# Patient Record
Sex: MALE | Race: WHITE | NOT HISPANIC OR LATINO | URBAN - METROPOLITAN AREA
[De-identification: names, ages, dates, MRNs, and addresses within clinical notes are randomized per-mention and may not be internally consistent; named-entity substitution may affect disease eponyms.]

---

## 2017-05-07 ENCOUNTER — HOSPITAL ENCOUNTER (OUTPATIENT)
Dept: URGENT CARE | Facility: CLINIC | Age: 27
Setting detail: SPECIMEN
Discharge: HOME | End: 2017-05-07
Attending: FAMILY MEDICINE | Admitting: FAMILY MEDICINE

## 2017-05-07 LAB
AZTREONAM SUSC ISLT: NORMAL
BACTERIA ISLT: NORMAL
BACTERIA SPEC AEROBE CULT: NORMAL
CEFEPIME SUSC ISLT: NORMAL
CEFTRIAXONE SUSC ISLT: NORMAL
CIPROFLOXACIN SUSC ISLT: NORMAL
COLONY COUNT: NORMAL
ERTAPENEM SUSC ISLT: NORMAL
LEVOFLOXACIN SUSC ISLT: NORMAL
Lab: NORMAL
MEROPENEM SUSC ISLT: NORMAL
MICRO REPORT STATUS: NORMAL
NITROFURANTOIN SUSC ISLT: NORMAL
PIP+TAZO SUSC ISLT: NORMAL
SPECIMEN SOURCE: NORMAL
SUSC METH SPEC: NORMAL
TETRACYCLINE SUSC ISLT: NORMAL
TOBRAMYCIN SUSC ISLT: NORMAL
TRIMETHOPRIM/SULFA: NORMAL

## 2023-07-28 ENCOUNTER — HOSPITAL ENCOUNTER (OUTPATIENT)
Dept: CARDIOLOGY | Facility: HOSPITAL | Age: 33
Discharge: HOME | End: 2023-07-28
Payer: MEDICARE

## 2023-07-28 DIAGNOSIS — R00.0 TACHYCARDIA: ICD-10-CM

## 2023-07-28 PROCEDURE — 93306 TTE W/DOPPLER COMPLETE: CPT

## 2023-07-31 ENCOUNTER — OFFICE VISIT (OUTPATIENT)
Dept: CARDIOLOGY | Facility: CLINIC | Age: 33
End: 2023-07-31
Payer: MEDICARE

## 2023-07-31 ENCOUNTER — LAB (OUTPATIENT)
Dept: LAB | Facility: HOSPITAL | Age: 33
End: 2023-07-31
Payer: MEDICARE

## 2023-07-31 VITALS
HEART RATE: 104 BPM | WEIGHT: 192.5 LBS | BODY MASS INDEX: 32.87 KG/M2 | HEIGHT: 64 IN | DIASTOLIC BLOOD PRESSURE: 84 MMHG | OXYGEN SATURATION: 99 % | SYSTOLIC BLOOD PRESSURE: 117 MMHG

## 2023-07-31 DIAGNOSIS — R06.83 SNORES: ICD-10-CM

## 2023-07-31 DIAGNOSIS — R00.0 TACHYCARDIA: ICD-10-CM

## 2023-07-31 DIAGNOSIS — E78.5 DYSLIPIDEMIA: ICD-10-CM

## 2023-07-31 DIAGNOSIS — R00.0 TACHYCARDIA: Primary | ICD-10-CM

## 2023-07-31 DIAGNOSIS — E11.9 TYPE 2 DIABETES MELLITUS WITHOUT COMPLICATION, WITH LONG-TERM CURRENT USE OF INSULIN: ICD-10-CM

## 2023-07-31 DIAGNOSIS — Z79.4 TYPE 2 DIABETES MELLITUS WITHOUT COMPLICATION, WITH LONG-TERM CURRENT USE OF INSULIN: ICD-10-CM

## 2023-07-31 DIAGNOSIS — E66.9 OBESITY (BMI 30-39.9): ICD-10-CM

## 2023-07-31 LAB
ALBUMIN SERPL-MCNC: 4.2 G/DL (ref 3.5–5.2)
ALBUMIN/GLOB SERPL: 1.8 G/DL
ALP SERPL-CCNC: 68 U/L (ref 39–117)
ALT SERPL W P-5'-P-CCNC: 11 U/L (ref 1–41)
ANION GAP SERPL CALCULATED.3IONS-SCNC: 13.1 MMOL/L (ref 5–15)
AST SERPL-CCNC: 13 U/L (ref 1–40)
BH CV ECHO MEAS - ACS: 1.9 CM
BH CV ECHO MEAS - AO MAX PG: 5.6 MMHG
BH CV ECHO MEAS - AO MEAN PG: 3 MMHG
BH CV ECHO MEAS - AO V2 MAX: 118 CM/SEC
BH CV ECHO MEAS - AO V2 VTI: 19.2 CM
BH CV ECHO MEAS - AVA(I,D): 3.2 CM2
BH CV ECHO MEAS - EDV(CUBED): 46.7 ML
BH CV ECHO MEAS - EDV(MOD-SP4): 69.4 ML
BH CV ECHO MEAS - EF(MOD-BP): 40 %
BH CV ECHO MEAS - EF(MOD-SP4): 40.2 %
BH CV ECHO MEAS - ESV(CUBED): 13.8 ML
BH CV ECHO MEAS - ESV(MOD-SP4): 41.5 ML
BH CV ECHO MEAS - FS: 33.3 %
BH CV ECHO MEAS - IVS/LVPW: 1 CM
BH CV ECHO MEAS - IVSD: 0.7 CM
BH CV ECHO MEAS - LA DIMENSION: 2.3 CM
BH CV ECHO MEAS - LAT PEAK E' VEL: 13.7 CM/SEC
BH CV ECHO MEAS - LV DIASTOLIC VOL/BSA (35-75): 38.4 CM2
BH CV ECHO MEAS - LV MASS(C)D: 65.8 GRAMS
BH CV ECHO MEAS - LV MAX PG: 5.1 MMHG
BH CV ECHO MEAS - LV MEAN PG: 3 MMHG
BH CV ECHO MEAS - LV SYSTOLIC VOL/BSA (12-30): 23 CM2
BH CV ECHO MEAS - LV V1 MAX: 113 CM/SEC
BH CV ECHO MEAS - LV V1 VTI: 19.4 CM
BH CV ECHO MEAS - LVIDD: 3.6 CM
BH CV ECHO MEAS - LVIDS: 2.4 CM
BH CV ECHO MEAS - LVOT AREA: 3.1 CM2
BH CV ECHO MEAS - LVOT DIAM: 2 CM
BH CV ECHO MEAS - LVPWD: 0.7 CM
BH CV ECHO MEAS - MED PEAK E' VEL: 9.8 CM/SEC
BH CV ECHO MEAS - MR MAX PG: 29.4 MMHG
BH CV ECHO MEAS - MR MAX VEL: 271 CM/SEC
BH CV ECHO MEAS - MV A MAX VEL: 73.9 CM/SEC
BH CV ECHO MEAS - MV DEC SLOPE: 387 CM/SEC2
BH CV ECHO MEAS - MV DEC TIME: 0.16 MSEC
BH CV ECHO MEAS - MV E MAX VEL: 72.8 CM/SEC
BH CV ECHO MEAS - MV E/A: 0.99
BH CV ECHO MEAS - MV MAX PG: 2.14 MMHG
BH CV ECHO MEAS - MV MEAN PG: 1 MMHG
BH CV ECHO MEAS - MV P1/2T: 56.5 MSEC
BH CV ECHO MEAS - MV V2 VTI: 15.4 CM
BH CV ECHO MEAS - MVA(P1/2T): 3.9 CM2
BH CV ECHO MEAS - MVA(VTI): 4 CM2
BH CV ECHO MEAS - PA ACC TIME: 0.16 SEC
BH CV ECHO MEAS - PA V2 MAX: 95.1 CM/SEC
BH CV ECHO MEAS - RV MAX PG: 1.76 MMHG
BH CV ECHO MEAS - RV V1 MAX: 66.3 CM/SEC
BH CV ECHO MEAS - RV V1 VTI: 12.8 CM
BH CV ECHO MEAS - RVDD: 3.2 CM
BH CV ECHO MEAS - SI(MOD-SP4): 15.4 ML/M2
BH CV ECHO MEAS - SV(LVOT): 60.9 ML
BH CV ECHO MEAS - SV(MOD-SP4): 27.9 ML
BH CV ECHO MEAS - TAPSE (>1.6): 1.57 CM
BH CV ECHO MEASUREMENTS AVERAGE E/E' RATIO: 6.2
BH CV XLRA - TDI S': 13.2 CM/SEC
BILIRUB SERPL-MCNC: 0.4 MG/DL (ref 0–1.2)
BUN SERPL-MCNC: 6 MG/DL (ref 6–20)
BUN/CREAT SERPL: 9.1 (ref 7–25)
CALCIUM SPEC-SCNC: 9.9 MG/DL (ref 8.6–10.5)
CHLORIDE SERPL-SCNC: 98 MMOL/L (ref 98–107)
CO2 SERPL-SCNC: 24.9 MMOL/L (ref 22–29)
CREAT SERPL-MCNC: 0.66 MG/DL (ref 0.76–1.27)
D DIMER PPP FEU-MCNC: <0.19 MG/L (FEU) (ref 0–0.5)
EGFRCR SERPLBLD CKD-EPI 2021: 127 ML/MIN/1.73
ERYTHROCYTE [SEDIMENTATION RATE] IN BLOOD: <1 MM/HR (ref 0–15)
GLOBULIN UR ELPH-MCNC: 2.3 GM/DL
GLUCOSE SERPL-MCNC: 89 MG/DL (ref 65–99)
HBA1C MFR BLD: 6.5 % (ref 4.8–5.6)
LEFT ATRIUM VOLUME INDEX: 10.2 ML/M2
NT-PROBNP SERPL-MCNC: 76 PG/ML (ref 0–450)
POTASSIUM SERPL-SCNC: 4.3 MMOL/L (ref 3.5–5.2)
PROT SERPL-MCNC: 6.5 G/DL (ref 6–8.5)
SINUS: 2.9 CM
SODIUM SERPL-SCNC: 136 MMOL/L (ref 136–145)
STJ: 2.8 CM
TSH SERPL DL<=0.05 MIU/L-ACNC: 2.94 UIU/ML (ref 0.27–4.2)

## 2023-07-31 PROCEDURE — 93000 ELECTROCARDIOGRAM COMPLETE: CPT | Performed by: INTERNAL MEDICINE

## 2023-07-31 PROCEDURE — 99204 OFFICE O/P NEW MOD 45 MIN: CPT | Performed by: INTERNAL MEDICINE

## 2023-07-31 PROCEDURE — 85652 RBC SED RATE AUTOMATED: CPT

## 2023-07-31 PROCEDURE — 84443 ASSAY THYROID STIM HORMONE: CPT

## 2023-07-31 PROCEDURE — 1160F RVW MEDS BY RX/DR IN RCRD: CPT | Performed by: INTERNAL MEDICINE

## 2023-07-31 PROCEDURE — 83036 HEMOGLOBIN GLYCOSYLATED A1C: CPT

## 2023-07-31 PROCEDURE — 80053 COMPREHEN METABOLIC PANEL: CPT

## 2023-07-31 PROCEDURE — 85379 FIBRIN DEGRADATION QUANT: CPT

## 2023-07-31 PROCEDURE — 1159F MED LIST DOCD IN RCRD: CPT | Performed by: INTERNAL MEDICINE

## 2023-07-31 PROCEDURE — 36415 COLL VENOUS BLD VENIPUNCTURE: CPT

## 2023-07-31 PROCEDURE — 83880 ASSAY OF NATRIURETIC PEPTIDE: CPT

## 2023-07-31 RX ORDER — QUETIAPINE 400 MG/1
800 TABLET, FILM COATED, EXTENDED RELEASE ORAL DAILY
COMMUNITY
Start: 2023-06-28

## 2023-07-31 RX ORDER — LEVOTHYROXINE SODIUM 0.03 MG/1
25 TABLET ORAL DAILY
COMMUNITY
Start: 2023-07-25

## 2023-07-31 RX ORDER — ATORVASTATIN CALCIUM 20 MG/1
20 TABLET, FILM COATED ORAL NIGHTLY
COMMUNITY
Start: 2023-07-24

## 2023-07-31 RX ORDER — OMEPRAZOLE 10 MG/1
10 CAPSULE, DELAYED RELEASE ORAL DAILY
COMMUNITY
Start: 2023-07-25

## 2023-07-31 RX ORDER — LANOLIN ALCOHOL/MO/W.PET/CERES
3 CREAM (GRAM) TOPICAL NIGHTLY
COMMUNITY
Start: 2023-07-10

## 2023-07-31 RX ORDER — DIVALPROEX SODIUM 500 MG/1
TABLET, DELAYED RELEASE ORAL
COMMUNITY
Start: 2023-06-28

## 2023-07-31 RX ORDER — TRAZODONE HYDROCHLORIDE 50 MG/1
50 TABLET ORAL NIGHTLY
COMMUNITY
Start: 2023-07-17

## 2023-07-31 RX ORDER — DULOXETIN HYDROCHLORIDE 60 MG/1
CAPSULE, DELAYED RELEASE ORAL
COMMUNITY
Start: 2023-07-17

## 2023-07-31 RX ORDER — DIVALPROEX SODIUM 250 MG/1
TABLET, DELAYED RELEASE ORAL
COMMUNITY
Start: 2023-06-28

## 2023-07-31 RX ORDER — BLOOD-GLUCOSE METER
KIT MISCELLANEOUS
COMMUNITY
Start: 2023-06-15

## 2023-07-31 RX ORDER — LANCETS 28 GAUGE
EACH MISCELLANEOUS
COMMUNITY
Start: 2023-07-17

## 2023-07-31 RX ORDER — LORAZEPAM 1 MG/1
1 TABLET ORAL DAILY
COMMUNITY
Start: 2023-07-11

## 2023-07-31 RX ORDER — BLOOD-GLUCOSE METER
KIT MISCELLANEOUS
COMMUNITY
Start: 2023-07-18

## 2023-07-31 RX ORDER — CHOLECALCIFEROL (VITAMIN D3) 125 MCG
CAPSULE ORAL
COMMUNITY
Start: 2023-07-10

## 2023-07-31 RX ORDER — BENZTROPINE MESYLATE 0.5 MG/1
TABLET ORAL
COMMUNITY
Start: 2023-07-17

## 2023-07-31 RX ORDER — PRAZOSIN HYDROCHLORIDE 2 MG/1
2 CAPSULE ORAL NIGHTLY
COMMUNITY
Start: 2023-07-17

## 2023-07-31 RX ORDER — FLUPHENAZINE HYDROCHLORIDE 10 MG/1
10 TABLET ORAL 2 TIMES DAILY
COMMUNITY
Start: 2023-07-17

## 2023-08-04 ENCOUNTER — TELEPHONE (OUTPATIENT)
Dept: CARDIOLOGY | Facility: CLINIC | Age: 33
End: 2023-08-04
Payer: MEDICARE

## 2023-08-07 ENCOUNTER — PATIENT ROUNDING (BHMG ONLY) (OUTPATIENT)
Dept: CARDIOLOGY | Facility: CLINIC | Age: 33
End: 2023-08-07
Payer: MEDICARE

## 2023-08-07 NOTE — PROGRESS NOTES
A My-Chart message has been sent to the patient [place] [facility] [place]

## 2023-08-09 ENCOUNTER — OFFICE VISIT (OUTPATIENT)
Dept: CARDIOLOGY | Facility: CLINIC | Age: 33
End: 2023-08-09
Payer: MEDICARE

## 2023-08-09 VITALS
SYSTOLIC BLOOD PRESSURE: 106 MMHG | HEIGHT: 64 IN | DIASTOLIC BLOOD PRESSURE: 71 MMHG | WEIGHT: 187 LBS | HEART RATE: 115 BPM | BODY MASS INDEX: 31.92 KG/M2

## 2023-08-09 DIAGNOSIS — E78.5 DYSLIPIDEMIA: ICD-10-CM

## 2023-08-09 DIAGNOSIS — R00.0 TACHYCARDIA: Primary | ICD-10-CM

## 2023-08-09 DIAGNOSIS — E66.9 OBESITY (BMI 30-39.9): ICD-10-CM

## 2023-08-09 DIAGNOSIS — E11.9 TYPE 2 DIABETES MELLITUS WITHOUT COMPLICATION, WITH LONG-TERM CURRENT USE OF INSULIN: ICD-10-CM

## 2023-08-09 DIAGNOSIS — Z79.4 TYPE 2 DIABETES MELLITUS WITHOUT COMPLICATION, WITH LONG-TERM CURRENT USE OF INSULIN: ICD-10-CM

## 2023-08-09 NOTE — PROGRESS NOTES
Subjective:     Encounter Date:08/09/2023      Patient ID: Balwindre Barr is a 32 y.o. male.    Chief Complaint and history of present illness:     Follow-up for tachycardia.  History of diabetes dyslipidemia obesity hypothyroidism     History of Present Illness:       Balwinder Barr has PMH of     -Multiple psych issues requiring Cogentin, Depakote, Cymbalta, Prolixin, Minipress, Seroquel, trazodone, melatonin, Ativan  -Hypothyroidism on levothyroxine  -Type 2 diabetes  -Obesity with BMI of 30     Here for follow-up.  Patient was seen for evaluation of tachycardia.  Patient had EKG done which showed fast heart rate therefore was sent here.  Patient is not aware of having fast heart rate.  Denies any chest pain shortness of breath or palpitations.  Denies any nausea vomiting diarrhea weight loss weight gain shortness of breath chest pain lightheadedness dizziness loss of consciousness bleeding.     Patient's arterial blood pressure is 106/71, heart rate 115 bpm BMI is over 30.     Labs from 7/1/2023 reveal BMP with a glucose of 123.  CBC with a hemoglobin of 15.  Patient had echo done 7/28/2023 which revealed normal LV systolic function borderline RV enlargement    Labs from 7/31/2023 revealed ESR less than 1.  D-dimer less than 0.19.  proBNP 76, TSH 2.94, A1c 6.5, normal CMP.       Assessment:  :     Tachycardia  Type 2 diabetes  Dyslipidemia  Obesity with BMI over 30  Snores           Recommendations / Plan:           Patient is tachycardic.  But is asymptomatic.  Can give him medications like ivabradine if he is symptomatic.  Patient has issues like hypothyroidism for which she is on levothyroxine and multiple psych medications for his psych disorders.  Needs to follow-up with those physicians to make sure none of those are causing tachycardia.  Counseled on smoking cessation.  Patient has borderline RV enlargement will benefit from sleep study since he snores.  Will check 24-hour urine for catecholamines and  VMA.  We will follow-up after testing and consider further evaluation treatment.  Advised patient to avoid dehydration, stimulants including nicotine, caffeine.  Will advise primary care physician to coordinate psychiatric, hypothyroidism care..  Discussed with patient and his guardian.            Procedures    Copied text in this portion of the note has been reviewed and is accurate as of 8/9/2023  The following portions of the patient's history were reviewed and updated as appropriate: allergies, current medications, past family history, past medical history, past social history, past surgical history and problem list.    Assessment:         [facility]       Diagnosis Plan   1. Tachycardia  Catecholamine+VMA, 24-Hr Urine - Urine, Clean Catch      2. Type 2 diabetes mellitus without complication, with long-term current use of insulin  Catecholamine+VMA, 24-Hr Urine - Urine, Clean Catch      3. Dyslipidemia  Catecholamine+VMA, 24-Hr Urine - Urine, Clean Catch      4. Obesity (BMI 30-39.9)  Catecholamine+VMA, 24-Hr Urine - Urine, Clean Catch             Plan:               Past Medical History:  Past Medical History:   Diagnosis Date    Diabetes mellitus 5/21/21    Hypertension      Past Surgical History:  History reviewed. No pertinent surgical history.   Allergies:  Allergies   Allergen Reactions    Amoxicillin Itching    Penicillin G Itching     Home Meds:  Current Meds:     Current Outpatient Medications:     atorvastatin (LIPITOR) 20 MG tablet, Take 1 tablet by mouth Every Night., Disp: , Rfl:     benztropine (COGENTIN) 0.5 MG tablet, , Disp: , Rfl:     Blood Glucose Monitoring Suppl (FreeStyle Lite) w/Device kit, USE 1 UNIT AS DIRECTED ONCE A WEEK, Disp: , Rfl:     Cholecalciferol (Vitamin D3) 50 MCG (2000 UT) tablet, , Disp: , Rfl:     divalproex (DEPAKOTE) 250 MG DR tablet, , Disp: , Rfl:     divalproex (DEPAKOTE) 500 MG DR tablet, , Disp: , Rfl:     DULoxetine (CYMBALTA) 60 MG capsule, , Disp: , Rfl:      "fluPHENAZine (PROLIXIN) 10 MG tablet, Take 1 tablet by mouth 2 (Two) Times a Day., Disp: , Rfl:     FREESTYLE LITE test strip, USE 1 STRIP AS DIRECTED ONCE A WEEK, Disp: , Rfl:     Lancets (freestyle) lancets, USE 1 LANCET AS DIRECTED ONCE A WEEK AS NEEDED FOR SYMPTOMS OF HYPOGLYCEMIA, Disp: , Rfl:     levothyroxine (SYNTHROID, LEVOTHROID) 25 MCG tablet, Take 1 tablet by mouth Daily., Disp: , Rfl:     LORazepam (ATIVAN) 1 MG tablet, Take 1 tablet by mouth Daily., Disp: , Rfl:     melatonin 3 MG tablet, Take 1 tablet by mouth Every Night., Disp: , Rfl:     metFORMIN (GLUCOPHAGE) 1000 MG tablet, Take 1 tablet by mouth 2 (Two) Times a Day With Meals., Disp: , Rfl:     omeprazole (prilOSEC) 10 MG capsule, Take 1 capsule by mouth Daily., Disp: , Rfl:     prazosin (MINIPRESS) 2 MG capsule, Take 1 capsule by mouth Every Night., Disp: , Rfl:     QUEtiapine XR (SEROquel XR) 400 MG 24 hr tablet, Take 2 tablets by mouth Daily., Disp: , Rfl:     traZODone (DESYREL) 50 MG tablet, Take 1 tablet by mouth Every Night., Disp: , Rfl:   Social History:   Social History     Tobacco Use    Smoking status: Every Day     Packs/day: 0.50     Years: 10.00     Pack years: 5.00     Types: Cigarettes     Start date: 7/24/2013    Smokeless tobacco: Former   Substance Use Topics    Alcohol use: Not Currently     Alcohol/week: 5.0 standard drinks     Types: 5 Cans of beer per week     Comment: Quit      Family History:  History reviewed. No pertinent family history.           Review of Systems   Cardiovascular:  Negative for chest pain, leg swelling and palpitations.   Respiratory:  Negative for shortness of breath.    Neurological:  Negative for dizziness and numbness.   All other systems are negative         Objective:     Physical Exam  /71 (BP Location: Left arm, Patient Position: Sitting, Cuff Size: Adult)   Pulse 115   Ht 162.6 cm (64\")   Wt 84.8 kg (187 lb)   BMI 32.10 kg/mý   General:  Appears in no acute distress  Eyes: Sclera " "is anicteric,  conjunctiva is clear   HEENT:  No JVD.  No carotid bruits  Respiratory: Respirations regular and unlabored at rest.  Clear to auscultation  Cardiovascular: S1,S2 Regular rate and rhythm. No murmur, rub or gallop auscultated.   Extremities: No digital clubbing or cyanosis, no edema  Skin: Color pink. Skin warm and dry to touch. No rashes  No xanthoma  Neuro: Alert and awake.    Lab Reviewed:         Ash Valdes MD  8/9/2023 10:14 EDT      EMR Dragon/Transcription:   \"Dictated utilizing Dragon dictation\".        "

## 2023-08-10 ENCOUNTER — LAB (OUTPATIENT)
Dept: LAB | Facility: HOSPITAL | Age: 33
End: 2023-08-10
Payer: MEDICARE

## 2023-08-10 PROCEDURE — 82384 ASSAY THREE CATECHOLAMINES: CPT | Performed by: INTERNAL MEDICINE

## 2023-08-10 PROCEDURE — 84585 ASSAY OF URINE VMA: CPT | Performed by: INTERNAL MEDICINE

## 2023-08-15 LAB
DOPAMINE 24H UR-MRATE: 97 UG/24 HR (ref 0–510)
DOPAMINE UR-MCNC: 72 UG/L
EPINEPH 24H UR-MRATE: <1 UG/24 HR (ref 0–20)
EPINEPH UR-MCNC: <1 UG/L
NOREPINEPH 24H UR-MRATE: 8 UG/24 HR (ref 0–135)
NOREPINEPH UR-MCNC: 6 UG/L
VMA 24H UR-MRATE: <.7 MG/24 HR (ref 0–7.5)
VMA UR-MCNC: <0.5 MG/L

## 2023-08-16 ENCOUNTER — TELEPHONE (OUTPATIENT)
Dept: CARDIOLOGY | Facility: CLINIC | Age: 33
End: 2023-08-16
Payer: MEDICARE

## 2023-08-16 DIAGNOSIS — R00.0 TACHYCARDIA: Primary | ICD-10-CM

## 2023-09-12 ENCOUNTER — OFFICE VISIT (OUTPATIENT)
Dept: CARDIOLOGY | Facility: CLINIC | Age: 33
End: 2023-09-12
Payer: MEDICARE

## 2023-09-12 VITALS
OXYGEN SATURATION: 99 % | BODY MASS INDEX: 31.55 KG/M2 | SYSTOLIC BLOOD PRESSURE: 119 MMHG | DIASTOLIC BLOOD PRESSURE: 84 MMHG | HEIGHT: 64 IN | HEART RATE: 115 BPM | WEIGHT: 184.8 LBS

## 2023-09-12 DIAGNOSIS — R00.0 SINUS TACHYCARDIA: Primary | ICD-10-CM

## 2023-09-12 NOTE — PROGRESS NOTES
HP      Name: Balwinder Barr ADMIT: (Not on file)   : 1990  PCP: Epifanio Aguirre AutumnMECHELLE alvarado    MRN: [id number] LOS: 0 days   AGE/SEX: 32 y.o. male  ROOM: Room/bed info not found     Chief Complaint   Patient presents with    Consult     NP-tachycardia       Subjective        History of present illness  Balwinder Barr is a 32-year-old male patient who has diabetes, hypothyroidism, psychiatric conditions, is here today to discuss about a tachycardia.  Patient was noted to have tachycardia for some time now.  He does not seem to be too symptomatic from it.  Denies any chest pain, no shortness of breath no lower extremity edema no syncopal episodes.    Past Medical History:   Diagnosis Date    Diabetes mellitus 20    Hypertension      History reviewed. No pertinent surgical history.  History reviewed. No pertinent family history.  Social History     Tobacco Use    Smoking status: Every Day     Packs/day: 0.50     Years: 10.00     Pack years: 5.00     Types: Cigarettes     Start date: 2013    Smokeless tobacco: Former   Vaping Use    Vaping Use: Never used   Substance Use Topics    Alcohol use: Not Currently     Alcohol/week: 5.0 standard drinks     Types: 5 Cans of beer per week     Comment: Quit    Drug use: Not Currently     Types: Marijuana     Comment: Quit     (Not in a hospital admission)    Allergies:  Amoxicillin and Penicillin g    Review of systems    Constitutional: Negative.    Respiratory and cardiovascular: As detailed in HPI section.  Gastrointestinal: Negative for constipation, nausea and vomiting negative for abdominal distention, abdominal pain and diarrhea.   Genitourinary: Negative for difficulty urinating and flank pain.   Musculoskeletal: Negative for arthralgias, joint swelling and myalgias.   Skin: Negative for color change, rash and wound.   Neurological: Negative for dizziness, syncope, weakness and headaches.   Hematological: Negative for adenopathy.   Psychiatric/Behavioral:  Negative for confusion.   All other systems reviewed and are negative.    Physical Exam  VITALS REVIEWED    General:      well developed, in no acute distress.    Head:      normocephalic and atraumatic.    Eyes:      PERRL/EOM intact, conjunctiva and sclera clear with out nystagmus.    Neck:      no masses, thyromegaly,  trachea central with normal respiratory effort and PMI displaced laterally  Lungs:      Clear to auscultation bilaterally  Heart:       Regular rate and rhythm, no murmur  Msk:      no deformity or scoliosis noted of thoracic or lumbar spine.    Pulses:      pulses normal in all 4 extremities.    Extremities:       No lower extremity edema  Neurologic:      no focal deficits.   alert oriented x3  Skin:      intact without lesions or rashes.    Psych:      alert and cooperative; normal mood and affect; normal attention span and concentration.      Result Review :               Pertinent cardiac workup    EKG 7/31/2023 normal sinus rhythm, heart rate 93 bpm.  Echo 7/31/2023 ejection fraction 60 to 65%, normal heart valves.      Procedures        Assessment and Plan      Balwinder Barr is a 32-year-old male patient who has type 1 diabetes, psychiatric issues, is here today due to tachycardia.  Patient seems to run a baseline sinus tachycardia.  Heart rate around 110 or so.  He is however not symptomatic, denies any palpitations, no chest pain no shortness of breath.  His echo is essentially normal, so is his EKG.  He was tested for pheochromocytoma and it was negative, also his thyroid levels are normal.  I think his sinus tachycardia is benign, most likely side effect of 1 or more of the antipsychotic medicines that he is on.  But he seems to be very stable on these medications.  If he becomes symptomatic, then calcium channel blockers or beta-blockers can be added.  Patient will continue to follow-up with Dr. Valdes, I will see him on as-needed basis.    Diagnoses and all orders for this  visit:    1. Sinus tachycardia (Primary)           Return if symptoms worsen or fail to improve.  Patient was given instructions and counseling regarding his condition or for health maintenance advice. Please see specific information pulled into the AVS if appropriate.

## 2023-09-18 ENCOUNTER — PATIENT ROUNDING (BHMG ONLY) (OUTPATIENT)
Dept: CARDIOLOGY | Facility: CLINIC | Age: 33
End: 2023-09-18
Payer: MEDICARE

## 2023-09-18 NOTE — PROGRESS NOTES
A My-Chart message has been sent to the patient [place] [facility] [place]

## 2024-08-11 NOTE — PROGRESS NOTES
Subjective:     Encounter Date:08/12/2024      Patient ID: Balwinder Barr is a 33 y.o. male.    Chief Complaint and history of present illness:     Follow-up for tachycardia.  History of diabetes dyslipidemia obesity hypothyroidism     History of Present Illness:       Balwinder Barr has PMH of     -Multiple psych issues requiring Cogentin, Depakote, Cymbalta, Prolixin, Minipress, Seroquel, trazodone, melatonin, Ativan  -Hypothyroidism on levothyroxine  -Type 2 diabetes  -Obesity with BMI of 30     Here for follow-up.  Patient was seen for evaluation of asymptomatic tachycardia.  Patient reportedly had an EKG which showed faster heart rate.  Workup here was negative including 24-hour urine for pheochromocytoma 7/31/2024.  He is here for follow-up.  Patient denies any chest pain shortness of breath or palpitations.     Patient's arterial blood pressure is 128/83, heart rate 94 bpm, O2 sat of 98% on room air.       Labs from 7/1/2023 reveal BMP with a glucose of 123.  CBC with a hemoglobin of 15.  Patient had echo done 7/28/2023 which revealed normal LV systolic function borderline RV enlargement     Labs from 7/31/2023 revealed ESR less than 1.  D-dimer less than 0.19.  proBNP 76, TSH 2.94, A1c 6.5, normal CMP.  Labs from 7/31/2023 reveal hemoglobin A1c of 6.5, normal TSH, D-dimer, ESR, CMP, proBNP normal at 76.  24-hour urine 8/10/2024 was negative for        Assessment:  :     Tachycardia  Type 2 diabetes  Dyslipidemia  Obesity with BMI over 30  Snores           Recommendations / Plan:            Reviewed EKG results with patient.  Patient used to have tachycardia, is now resolved.  Will continue to monitor  Counseled on smoking cessation.  Patient has borderline RV enlargement will benefit from sleep study since he snores.  Advised patient to follow-up with PMD  Follow-up with cardiology as needed.               ECG 12 Lead    Date/Time: 8/12/2024 2:10 PM  Performed by: Ash Valdes MD    Authorized  by: Ash Valdes MD  Comparison: compared with previous ECG from 7/31/2023  Comparison to previous ECG: EKG done today reviewed/interpreted by me reveals sinus rhythm at the rate of 91 bpm, compared EKG from 7/31/2023 heart rate is slower.          Copied text in this portion of the note has been reviewed and is accurate as of 8/12/2024  The following portions of the patient's history were reviewed and updated as appropriate: allergies, current medications, past family history, past medical history, past social history, past surgical history and problem list.    Assessment:         [facility]       Diagnosis Plan   1. Tachycardia        2. Type 2 diabetes mellitus without complication, without long-term current use of insulin        3. Dyslipidemia        4. Cigarette smoker               Plan:               Past Medical History:  Past Medical History:   Diagnosis Date    Diabetes mellitus 5/21/21    Hypertension      Past Surgical History:  History reviewed. No pertinent surgical history.   Allergies:  Allergies   Allergen Reactions    Amoxicillin Itching    Penicillin G Itching     Home Meds:  Current Meds:     Current Outpatient Medications:     atorvastatin (LIPITOR) 20 MG tablet, Take 1 tablet by mouth Every Night., Disp: , Rfl:     benztropine (COGENTIN) 0.5 MG tablet, , Disp: , Rfl:     Blood Glucose Monitoring Suppl (FreeStyle Lite) w/Device kit, USE 1 UNIT AS DIRECTED ONCE A WEEK, Disp: , Rfl:     Cholecalciferol (Vitamin D3) 50 MCG (2000 UT) tablet, , Disp: , Rfl:     divalproex (DEPAKOTE) 250 MG DR tablet, , Disp: , Rfl:     divalproex (DEPAKOTE) 500 MG DR tablet, , Disp: , Rfl:     DULoxetine (CYMBALTA) 60 MG capsule, , Disp: , Rfl:     fluPHENAZine (PROLIXIN) 10 MG tablet, Take 1 tablet by mouth 2 (Two) Times a Day., Disp: , Rfl:     FREESTYLE LITE test strip, USE 1 STRIP AS DIRECTED ONCE A WEEK, Disp: , Rfl:     Lancets (freestyle) lancets, USE 1 LANCET AS DIRECTED ONCE A WEEK AS NEEDED FOR  "SYMPTOMS OF HYPOGLYCEMIA, Disp: , Rfl:     levothyroxine (SYNTHROID, LEVOTHROID) 25 MCG tablet, Take 1 tablet by mouth Daily., Disp: , Rfl:     LORazepam (ATIVAN) 1 MG tablet, Take 1 tablet by mouth Daily., Disp: , Rfl:     melatonin 3 MG tablet, Take 1 tablet by mouth Every Night., Disp: , Rfl:     metFORMIN (GLUCOPHAGE) 1000 MG tablet, Take 1 tablet by mouth 2 (Two) Times a Day With Meals., Disp: , Rfl:     omeprazole (prilOSEC) 10 MG capsule, Take 1 capsule by mouth Daily., Disp: , Rfl:     prazosin (MINIPRESS) 2 MG capsule, Take 1 capsule by mouth Every Night., Disp: , Rfl:     QUEtiapine XR (SEROquel XR) 400 MG 24 hr tablet, Take 2 tablets by mouth Daily., Disp: , Rfl:     traZODone (DESYREL) 50 MG tablet, Take 1 tablet by mouth Every Night., Disp: , Rfl:   Social History:   Social History     Tobacco Use    Smoking status: Every Day     Current packs/day: 0.50     Average packs/day: 0.5 packs/day for 11.1 years (5.5 ttl pk-yrs)     Types: Cigarettes     Start date: 7/24/2013    Smokeless tobacco: Former   Substance Use Topics    Alcohol use: Not Currently     Alcohol/week: 5.0 standard drinks of alcohol     Types: 5 Cans of beer per week     Comment: Quit      Family History:  History reviewed. No pertinent family history.           ROS  All other systems are negative         Objective:     Physical Exam  /83   Pulse 94   Ht 162.6 cm (64\")   Wt 78.9 kg (174 lb)   SpO2 98%   BMI 29.87 kg/m²   General:  Appears in no acute distress  Eyes: Sclera is anicteric,  conjunctiva is clear   HEENT:  No JVD.  No carotid bruits  Respiratory: Respirations regular and unlabored at rest.  Clear to auscultation  Cardiovascular: S1,S2 Regular rate and rhythm. .   Extremities: No digital clubbing or cyanosis, no edema  Skin: Color pink. Skin warm and dry to touch. No rashes  No xanthoma  Neuro: Alert and awake.    Lab Reviewed:         Ash Valdes MD  8/12/2024 14:11 EDT      EMR Dragon/Transcription: " "  \"Dictated utilizing Dragon dictation\".        "

## 2024-08-12 ENCOUNTER — OFFICE VISIT (OUTPATIENT)
Dept: CARDIOLOGY | Facility: CLINIC | Age: 34
End: 2024-08-12
Payer: MEDICARE

## 2024-08-12 VITALS
SYSTOLIC BLOOD PRESSURE: 128 MMHG | DIASTOLIC BLOOD PRESSURE: 83 MMHG | BODY MASS INDEX: 29.71 KG/M2 | WEIGHT: 174 LBS | OXYGEN SATURATION: 98 % | HEART RATE: 94 BPM | HEIGHT: 64 IN

## 2024-08-12 DIAGNOSIS — E11.9 TYPE 2 DIABETES MELLITUS WITHOUT COMPLICATION, WITHOUT LONG-TERM CURRENT USE OF INSULIN: ICD-10-CM

## 2024-08-12 DIAGNOSIS — R00.0 TACHYCARDIA: Primary | ICD-10-CM

## 2024-08-12 DIAGNOSIS — E78.5 DYSLIPIDEMIA: ICD-10-CM

## 2024-08-12 DIAGNOSIS — F17.210 CIGARETTE SMOKER: ICD-10-CM

## 2024-08-12 PROCEDURE — 93000 ELECTROCARDIOGRAM COMPLETE: CPT | Performed by: INTERNAL MEDICINE

## 2024-08-12 PROCEDURE — 1160F RVW MEDS BY RX/DR IN RCRD: CPT | Performed by: INTERNAL MEDICINE

## 2024-08-12 PROCEDURE — 1159F MED LIST DOCD IN RCRD: CPT | Performed by: INTERNAL MEDICINE

## 2024-08-12 PROCEDURE — 99214 OFFICE O/P EST MOD 30 MIN: CPT | Performed by: INTERNAL MEDICINE

## 2024-12-24 ENCOUNTER — HOSPITAL ENCOUNTER (EMERGENCY)
Facility: HOSPITAL | Age: 34
Discharge: HOME | End: 2024-12-24
Attending: EMERGENCY MEDICINE | Admitting: EMERGENCY MEDICINE
Payer: MEDICARE

## 2024-12-24 ENCOUNTER — APPOINTMENT (OUTPATIENT)
Dept: GENERAL RADIOLOGY | Facility: HOSPITAL | Age: 34
End: 2024-12-24
Payer: MEDICARE

## 2024-12-24 VITALS
OXYGEN SATURATION: 97 % | TEMPERATURE: 98.9 F | BODY MASS INDEX: 32.44 KG/M2 | HEART RATE: 112 BPM | SYSTOLIC BLOOD PRESSURE: 127 MMHG | WEIGHT: 190 LBS | HEIGHT: 64 IN | RESPIRATION RATE: 20 BRPM | DIASTOLIC BLOOD PRESSURE: 91 MMHG

## 2024-12-24 DIAGNOSIS — J06.9 UPPER RESPIRATORY TRACT INFECTION, UNSPECIFIED TYPE: Primary | ICD-10-CM

## 2024-12-24 LAB
B PARAPERT DNA SPEC QL NAA+PROBE: NOT DETECTED
B PERT DNA SPEC QL NAA+PROBE: NOT DETECTED
C PNEUM DNA NPH QL NAA+NON-PROBE: NOT DETECTED
FLUAV SUBTYP SPEC NAA+PROBE: NOT DETECTED
FLUBV RNA ISLT QL NAA+PROBE: NOT DETECTED
HADV DNA SPEC NAA+PROBE: NOT DETECTED
HCOV 229E RNA SPEC QL NAA+PROBE: NOT DETECTED
HCOV HKU1 RNA SPEC QL NAA+PROBE: NOT DETECTED
HCOV NL63 RNA SPEC QL NAA+PROBE: NOT DETECTED
HCOV OC43 RNA SPEC QL NAA+PROBE: DETECTED
HMPV RNA NPH QL NAA+NON-PROBE: NOT DETECTED
HPIV1 RNA ISLT QL NAA+PROBE: NOT DETECTED
HPIV2 RNA SPEC QL NAA+PROBE: NOT DETECTED
HPIV3 RNA NPH QL NAA+PROBE: NOT DETECTED
HPIV4 P GENE NPH QL NAA+PROBE: NOT DETECTED
M PNEUMO IGG SER IA-ACNC: NOT DETECTED
RHINOVIRUS RNA SPEC NAA+PROBE: NOT DETECTED
RSV RNA NPH QL NAA+NON-PROBE: NOT DETECTED
SARS-COV-2 RNA RESP QL NAA+PROBE: NOT DETECTED

## 2024-12-24 PROCEDURE — 0202U NFCT DS 22 TRGT SARS-COV-2: CPT | Performed by: EMERGENCY MEDICINE

## 2024-12-24 PROCEDURE — 71045 X-RAY EXAM CHEST 1 VIEW: CPT

## 2024-12-24 PROCEDURE — 93005 ELECTROCARDIOGRAM TRACING: CPT | Performed by: EMERGENCY MEDICINE

## 2024-12-24 PROCEDURE — 99284 EMERGENCY DEPT VISIT MOD MDM: CPT

## 2024-12-24 RX ORDER — IBUPROFEN 600 MG/1
600 TABLET, FILM COATED ORAL ONCE
Status: COMPLETED | OUTPATIENT
Start: 2024-12-24 | End: 2024-12-24

## 2024-12-24 RX ORDER — METHYLPREDNISOLONE 4 MG/1
TABLET ORAL
Qty: 21 TABLET | Refills: 0 | Status: SHIPPED | OUTPATIENT
Start: 2024-12-24 | End: 2024-12-24

## 2024-12-24 RX ORDER — METHYLPREDNISOLONE 4 MG/1
TABLET ORAL
Qty: 21 TABLET | Refills: 0 | Status: SHIPPED | OUTPATIENT
Start: 2024-12-24

## 2024-12-24 RX ADMIN — IBUPROFEN 600 MG: 600 TABLET, FILM COATED ORAL at 11:19

## 2024-12-24 NOTE — CASE MANAGEMENT/SOCIAL WORK
Social Work Assessment   Jose     Patient Name: Balwinder Barr  MRN: [id number]  Today's Date: 12/24/2024    Admit Date: 12/24/2024     Demographic Summary       Row Name 12/24/24 1249       General Information    Reason for Consult Transportation      General Information Comments SW was consulted re: transportation back [facility] at [address] [place] [address]. SW called and spoke with pt's guardian (listed contact) who will call [employer] for transportation and call pt's nurse with p/u time. Nurse and RNCM updated via [place].             CARLO Jeffrey, W  Medical Social Worker  Ph [phone]  Fax [phone]  [email or web address]

## 2024-12-24 NOTE — ED NOTES
Attempted to find the correct number for [employer]. No contact info was sent with pt. A different [employer] branch found the correct number and transferred this nurse to it, but there has been no answer several times.  A message has been left. Case management and unit secretary are helping getting in contact with facility pt came from to get a ride and give report.

## 2024-12-24 NOTE — ED PROVIDER NOTES
"Subjective   History of Present Illness  Chief complaint: Cough    34-year-old male presents with cough, congestion, shortness of breath.  Patient states symptoms have been present for the past couple weeks but worse over the past couple days.  He denies fever.  No known sick contacts.  He states his symptoms are worse when he smokes cigarettes.    History provided by:  Patient      Review of Systems   Constitutional:  Negative for fever.   HENT:  Positive for congestion.    Respiratory:  Positive for cough and shortness of breath.    Cardiovascular:  Negative for chest pain.   Gastrointestinal:  Negative for abdominal pain and vomiting.   Musculoskeletal:  Negative for back pain.   Neurological:  Negative for headaches.   Psychiatric/Behavioral:  Negative for confusion.        Past Medical History:   Diagnosis Date    Diabetes mellitus 5/21/21    Hypertension        Allergies   Allergen Reactions    Amoxicillin Itching    Penicillin G Itching       No past surgical history on file.    No family history on file.    Social History     Socioeconomic History    Marital status: Single   Tobacco Use    Smoking status: Every Day     Current packs/day: 0.50     Average packs/day: 0.5 packs/day for 11.4 years (5.7 ttl pk-yrs)     Types: Cigarettes     Start date: 7/24/2013    Smokeless tobacco: Former   Vaping Use    Vaping status: Never Used   Substance and Sexual Activity    Alcohol use: Not Currently     Alcohol/week: 5.0 standard drinks of alcohol     Types: 5 Cans of beer per week     Comment: Quit    Drug use: Not Currently     Types: Marijuana     Comment: Quit    Sexual activity: Not Currently     Partners: Female     Birth control/protection: Condom       /91   Pulse 112   Temp 98.9 °F (37.2 °C) (Oral)   Resp 20   Ht 162.6 cm (64\")   Wt 86.2 kg (190 lb)   SpO2 97%   BMI 32.61 kg/m²       Objective   Physical Exam  Vitals and nursing note reviewed.   Constitutional:       Appearance: He is " well-developed.   HENT:      Head: Normocephalic and atraumatic.      Nose: Congestion present.      Mouth/Throat:      Mouth: Mucous membranes are moist.   Cardiovascular:      Rate and Rhythm: Normal rate and regular rhythm.      Heart sounds: Normal heart sounds.   Pulmonary:      Effort: Pulmonary effort is normal. No respiratory distress.      Breath sounds: Normal breath sounds.   Abdominal:      General: Bowel sounds are normal.      Palpations: Abdomen is soft.      Tenderness: There is no abdominal tenderness.   Musculoskeletal:      Right lower leg: No tenderness. No edema.      Left lower leg: No tenderness. No edema.   Skin:     General: Skin is warm and dry.   Neurological:      Mental Status: He is alert and oriented to person, place, and time.         Procedures           ED Course      My interpretation of EKG shows sinus tachycardia, rate of 109, no ST elevation                                     Results for orders placed or performed during the hospital encounter of 12/24/24   ECG 12 Lead Dyspnea    Collection Time: 12/24/24 10:30 AM   Result Value Ref Range    QT Interval 297 ms    QTC Interval 401 ms   Respiratory Panel PCR w/COVID-19(SARS-CoV-2) SALINAS/CHUCK/SAM/PAD/COR/ANNEMARIE In-House, NP Swab in [place], 2 HR TAT - Swab, Nasopharynx    Collection Time: 12/24/24 10:50 AM    Specimen: Nasopharynx; Swab   Result Value Ref Range    ADENOVIRUS, PCR Not Detected Not Detected    Coronavirus 229E Not Detected Not Detected    Coronavirus HKU1 Not Detected Not Detected    Coronavirus NL63 Not Detected Not Detected    Coronavirus OC43 Detected (A) Not Detected    COVID19 Not Detected Not Detected - Ref. Range    Human Metapneumovirus Not Detected Not Detected    Human Rhinovirus/Enterovirus Not Detected Not Detected    Influenza A PCR Not Detected Not Detected    Influenza B PCR Not Detected Not Detected    Parainfluenza Virus 1 Not Detected Not Detected    Parainfluenza Virus 2 Not Detected Not Detected     Parainfluenza Virus 3 Not Detected Not Detected    Parainfluenza Virus 4 Not Detected Not Detected    RSV, PCR Not Detected Not Detected    Bordetella pertussis pcr Not Detected Not Detected    Bordetella parapertussis PCR Not Detected Not Detected    Chlamydophila pneumoniae PCR Not Detected Not Detected    Mycoplasma pneumo by PCR Not Detected Not Detected     XR Chest 1 View    Result Date: 12/24/2024  Impression: No acute cardiopulmonary abnormality. Electronically Signed: Gray Mckinnon MD  12/24/2024 11:05 AM [facility]  Workstation ID: [id number]                 Medical Decision Making    My interpretation of chest x-ray shows no infiltrate or effusion.  Respiratory panel is positive for coronavirus OC43.  Patient is oxygenating well on room air.  I see no indication for admission at this time.  He will be discharged and will be given a prescription for Medrol Dosepak.      Final diagnoses:   Upper respiratory tract infection, unspecified type       ED Disposition  ED Disposition       ED Disposition   Discharge    Condition   Stable    Comment   --               Autumn Brothers, APRN  [address] [facility] [place] [address] [phone]    Call in 2 days           Medication List        New Prescriptions      methylPREDNISolone 4 MG dose pack  Commonly known as: MEDROL  Take as directed on package instructions.               Where to Get Your Medications        These medications were sent to [facility] [place] - [address] [place] [address] [phone]  - [phone] [address] [place] [address]      Phone: [phone]   methylPREDNISolone 4 MG dose pack            Kosta Pearce MD  12/24/24 2763

## 2024-12-26 LAB
QT INTERVAL: 297 MS
QTC INTERVAL: 401 MS